# Patient Record
Sex: MALE | Race: BLACK OR AFRICAN AMERICAN
[De-identification: names, ages, dates, MRNs, and addresses within clinical notes are randomized per-mention and may not be internally consistent; named-entity substitution may affect disease eponyms.]

---

## 2018-02-05 ENCOUNTER — HOSPITAL ENCOUNTER (EMERGENCY)
Dept: HOSPITAL 74 - JER | Age: 23
Discharge: HOME | End: 2018-02-05
Payer: SELF-PAY

## 2018-02-05 VITALS — SYSTOLIC BLOOD PRESSURE: 142 MMHG | DIASTOLIC BLOOD PRESSURE: 89 MMHG | TEMPERATURE: 98.7 F | HEART RATE: 75 BPM

## 2018-02-05 VITALS — BODY MASS INDEX: 24.9 KG/M2

## 2018-02-05 DIAGNOSIS — B97.89: ICD-10-CM

## 2018-02-05 DIAGNOSIS — A08.4: Primary | ICD-10-CM

## 2018-02-05 LAB
ALBUMIN SERPL-MCNC: 4.5 G/DL (ref 3.4–5)
ALP SERPL-CCNC: 103 U/L (ref 45–117)
ALT SERPL-CCNC: 29 U/L (ref 12–78)
ANION GAP SERPL CALC-SCNC: 9 MMOL/L (ref 8–16)
AST SERPL-CCNC: 30 U/L (ref 15–37)
BASOPHILS # BLD: 0.3 % (ref 0–2)
BILIRUB SERPL-MCNC: 0.6 MG/DL (ref 0.2–1)
BUN SERPL-MCNC: 11 MG/DL (ref 7–18)
CALCIUM SERPL-MCNC: 9.8 MG/DL (ref 8.5–10.1)
CHLORIDE SERPL-SCNC: 100 MMOL/L (ref 98–107)
CO2 SERPL-SCNC: 28 MMOL/L (ref 21–32)
CREAT SERPL-MCNC: 1 MG/DL (ref 0.7–1.3)
DEPRECATED RDW RBC AUTO: 12.9 % (ref 11.9–15.9)
EOSINOPHIL # BLD: 0.1 % (ref 0–4.5)
GLUCOSE SERPL-MCNC: 125 MG/DL (ref 74–106)
HCT VFR BLD CALC: 46.3 % (ref 35.4–49)
HGB BLD-MCNC: 15.3 GM/DL (ref 11.7–16.9)
INR BLD: 1.19 (ref 0.82–1.09)
LIPASE SERPL-CCNC: 186 U/L (ref 73–393)
LYMPHOCYTES # BLD: 7.6 % (ref 8–40)
MCH RBC QN AUTO: 29 PG (ref 25.7–33.7)
MCHC RBC AUTO-ENTMCNC: 33.1 G/DL (ref 32–35.9)
MCV RBC: 87.8 FL (ref 80–96)
MONOCYTES # BLD AUTO: 3.5 % (ref 3.8–10.2)
NEUTROPHILS # BLD: 88.5 % (ref 42.8–82.8)
PLATELET # BLD AUTO: 221 K/MM3 (ref 134–434)
PMV BLD: 9.3 FL (ref 7.5–11.1)
POTASSIUM SERPLBLD-SCNC: 4.8 MMOL/L (ref 3.5–5.1)
PROT SERPL-MCNC: 8.4 G/DL (ref 6.4–8.2)
PT PNL PPP: 13.4 SEC (ref 9.98–11.88)
RBC # BLD AUTO: 5.27 M/MM3 (ref 4–5.6)
SODIUM SERPL-SCNC: 137 MMOL/L (ref 136–145)
WBC # BLD AUTO: 7.9 K/MM3 (ref 4–10)

## 2018-02-05 NOTE — PDOC
Attending Attestation





- Resident


Resident Name: Yessenia Crook





- ED Attending Attestation


I have performed the following: I have examined & evaluated the patient, The 

case was reviewed & discussed with the resident, I agree w/resident's findings 

& plan





- HPI


HPI: 





02/05/18 04:59


Pt comes with vomiting up his lox 4-5hrs after eating it.


No fever and no chills.


Pt has no other complaints.





- Physicial Exam


PE: 





02/05/18 05:00


Agree with resident exam.





- Medical Decision Making





02/05/18 05:00


IVF; pepcid; analgesics.


Home with PMD follow up.


02/05/18 06:15


Repeat lactic acid is 1.5

## 2018-02-05 NOTE — PDOC
History of Present Illness





- General


Stated Complaint: ABD PAIN


Time Seen by Provider: 02/05/18 01:44


History Source: Patient





- History of Present Illness


Initial Comments: 





02/05/18 03:40


22 y.o. male with no PMH who presents to the ED today c/o acute onset of 

abdominal pain and associated emesis.  Pain is sharp, 10/10 B/L LQ and does not 

radiate with no identifiable triggering or relieving factors.  Patient endorse 5

-6 episodes of NBNB emesis.  Patient denies any diarrhea/constipation and notes 

his last BM was earlier today and was normal.  Patient's most recent PO intake 

was at 1 p.m.





Patient denies chest pain, shortness of breath, fevers/chills.  








Past History





- Past Medical History


Allergies/Adverse Reactions: 


 Allergies











Allergy/AdvReac Type Severity Reaction Status Date / Time


 


No Known Drug Allergies Allergy   Verified 02/08/18 00:28


 


shellfish derived Allergy   Verified 02/07/18 22:13











Home Medications: 


Ambulatory Orders





Amox-Tr/K Cl [Augmentin - 500Mg Tablet] 1 tab PO BID #14 tab 02/08/18 











**Review of Systems





- Review of Systems


Constitutional: No: Chills, Fever


HEENTM: No: Recent change in vision


Respiratory: No: Shortness of Breath


Cardiac (ROS): No: Chest Pain


ABD/GI: Yes: Vomiting.  No: Blood Streaked Bowels, Constipated, Diarrhea, Nausea

, Tarry Stools


: No: Burning, Dysuria





*Physical Exam





- Physical Exam


General Appearance: Yes: Nourished, Appropriately Dressed


Neck: positive: Trachea midline, Supple


Respiratory/Chest: positive: Lungs Clear


Cardiovascular: positive: S1, S2


Gastrointestinal/Abdominal: positive: Normal Bowel Sounds, Soft.  negative: 

Protuberent, Guarding, Rebound, Hernia, Mass


Musculoskeletal: negative: CVA Tenderness (R), CVA Tenderness (L)


Extremity: positive: Normal Capillary Refill, Normal Inspection


Integumentary: positive: Normal Color, Dry, Warm


Neurologic: positive: Fully Oriented, Alert





ED Treatment Course





- LABORATORY


CBC & Chemistry Diagram: 


 02/05/18 02:03





 02/05/18 02:03





Medical Decision Making





- Medical Decision Making





22 y.o. male with no reported PMH presents with LLQ abdominal pain and emesis.  

On PE patient is hemodynamically stable and abdominal exam shows mild LLQ 

tenderness.  Afebrile, (-) leukocytosis, (-)  Lactic Acidosis resolved with IV 

NS.  Patient's pain improved with Toradol.  Will discharge home with return 

precautions. 








*DC/Admit/Observation/Transfer


Diagnosis at time of Disposition: 


 Abdominal pain








- Discharge Dispostion


Disposition: HOME


Condition at time of disposition: Good


Admit: No





- Referrals


Referrals: 


Lala Holbrook NP [Primary Care Provider] - 





- Patient Instructions


Printed Discharge Instructions:  DI for Viral Gastroenteritis -- Adult


Additional Instructions: 


Please follow-up with your primary care doctor in 3-5 days.  Return to the 

Emergency Department for any new/worsening/concerning symptoms. 





- Post Discharge Activity


Forms/Work/School Notes:  Back to Work

## 2018-02-07 ENCOUNTER — HOSPITAL ENCOUNTER (OUTPATIENT)
Dept: HOSPITAL 74 - JER | Age: 23
LOS: 1 days | Discharge: HOME | End: 2018-02-08
Attending: UROLOGY
Payer: SELF-PAY

## 2018-02-07 VITALS — BODY MASS INDEX: 19 KG/M2

## 2018-02-08 VITALS — SYSTOLIC BLOOD PRESSURE: 137 MMHG | DIASTOLIC BLOOD PRESSURE: 70 MMHG | HEART RATE: 92 BPM

## 2018-02-08 VITALS — TEMPERATURE: 98.8 F

## 2018-02-08 LAB
ALBUMIN SERPL-MCNC: 4 G/DL (ref 3.4–5)
ALP SERPL-CCNC: 96 U/L (ref 45–117)
ALT SERPL-CCNC: 23 U/L (ref 12–78)
ANION GAP SERPL CALC-SCNC: 8 MMOL/L (ref 8–16)
APPEARANCE UR: CLEAR
AST SERPL-CCNC: 33 U/L (ref 15–37)
BASOPHILS # BLD: 0.7 % (ref 0–2)
BILIRUB SERPL-MCNC: 0.7 MG/DL (ref 0.2–1)
BILIRUB UR STRIP.AUTO-MCNC: NEGATIVE MG/DL
BUN SERPL-MCNC: 17 MG/DL (ref 7–18)
CALCIUM SERPL-MCNC: 9.4 MG/DL (ref 8.5–10.1)
CHLORIDE SERPL-SCNC: 101 MMOL/L (ref 98–107)
CO2 SERPL-SCNC: 30 MMOL/L (ref 21–32)
COLOR UR: YELLOW
CREAT SERPL-MCNC: 1 MG/DL (ref 0.7–1.3)
DEPRECATED RDW RBC AUTO: 13.3 % (ref 11.9–15.9)
EOSINOPHIL # BLD: 1.5 % (ref 0–4.5)
GLUCOSE SERPL-MCNC: 97 MG/DL (ref 74–106)
HCT VFR BLD CALC: 45.2 % (ref 35.4–49)
HGB BLD-MCNC: 15.1 GM/DL (ref 11.7–16.9)
INR BLD: 1.22 (ref 0.82–1.09)
KETONES UR QL STRIP: NEGATIVE
LEUKOCYTE ESTERASE UR QL STRIP.AUTO: NEGATIVE
LYMPHOCYTES # BLD: 22.6 % (ref 8–40)
MCH RBC QN AUTO: 29.6 PG (ref 25.7–33.7)
MCHC RBC AUTO-ENTMCNC: 33.4 G/DL (ref 32–35.9)
MCV RBC: 88.5 FL (ref 80–96)
MONOCYTES # BLD AUTO: 15.9 % (ref 3.8–10.2)
NEUTROPHILS # BLD: 59.3 % (ref 42.8–82.8)
NITRITE UR QL STRIP: NEGATIVE
PH UR: 7 [PH] (ref 5–8)
PLATELET # BLD AUTO: 190 K/MM3 (ref 134–434)
PMV BLD: 9.1 FL (ref 7.5–11.1)
POTASSIUM SERPLBLD-SCNC: 4.4 MMOL/L (ref 3.5–5.1)
PROT SERPL-MCNC: 7.7 G/DL (ref 6.4–8.2)
PROT UR QL STRIP: NEGATIVE
PROT UR QL STRIP: NEGATIVE
PT PNL PPP: 13.8 SEC (ref 9.98–11.88)
RBC # BLD AUTO: 5.11 M/MM3 (ref 4–5.6)
RBC # UR STRIP: NEGATIVE /UL
SODIUM SERPL-SCNC: 139 MMOL/L (ref 136–145)
SP GR UR: 1.02 (ref 1–1.03)
UROBILINOGEN UR STRIP-MCNC: 2 MG/DL (ref 0.2–1)
WBC # BLD AUTO: 9.9 K/MM3 (ref 4–10)

## 2018-02-08 NOTE — PDOC
History of Present Illness





- General


History Source: Patient


Exam Limitations: No Limitations





- History of Present Illness


Initial Comments: 





02/08/18 01:06


The patient is a 22 year old male with no significant past medical history who 

returns to the ED for approximately 4 days of left lower quadrant pain. The 

patient presented to the ED complaining of LLQ pain with nausea and vomiting 2 

days ago (2/5/2018). Bloodwork was obtained, no imaging. The patient was 

discharged home. He states that since then his pain has been getting worse and 

migrating downward. Pain became significantly worse 6 AM the morning of ED 

arrival. He also now complains of left testicular pain and fullness. Vomiting 

is now resolved. No dysuria or hematuria. 





<Deborah Baker - Last Filed: 02/08/18 01:41>





<Chente Rojas - Last Filed: 02/08/18 02:00>





- General


Chief Complaint: Pain


Stated Complaint: PAIN


Time Seen by Provider: 02/08/18 00:10





Past History





<Deborah Baker - Last Filed: 02/08/18 01:41>





- Past Medical History


COPD: No





- Suicide/Smoking/Psychosocial Hx


Smoking History: Never smoked


Have you smoked in the past 12 months: No


Information on smoking cessation initiated: No


Hx Alcohol Use: No


Drug/Substance Use Hx: Yes (Marijuana)


Substance Use Type: Marijuana





<Chetne Rojas - Last Filed: 02/08/18 02:00>





- Past Medical History


Allergies/Adverse Reactions: 


 Allergies











Allergy/AdvReac Type Severity Reaction Status Date / Time


 


No Known Drug Allergies Allergy   Verified 02/08/18 00:28


 


shellfish derived Allergy   Verified 02/07/18 22:13











Home Medications: 


Ambulatory Orders





NK [No Known Home Medication]  02/05/18 











**Review of Systems





- Review of Systems


Able to Perform ROS?: Yes


Comments:: 





02/08/18 01:12


CONSTITUTIONAL: No fever, no chills, no fatigue


EYES: No visual changes


ENT: No ear pain, no sore throat


CARDIOVASCULAR: No chest pain, no palpitations


RESPIRATORY: No cough, no SOB


GI: +LLQ pain. +Nausea, vomiting (now resolved). No constipation, no diarrhea


GENITOURINARY: +Left testicular pain, swelling. No dysuria, no frequency, no 

hematuria


MUSCULOSKELETAL: No backpain, no joint pain, no myalgias


SKIN: No rash


NEURO: No headache








<Deborah Baker - Last Filed: 02/08/18 01:41>





*Physical Exam





- Vital Signs


 Last Vital Signs











Temp Pulse Resp BP Pulse Ox


 


 98.9 F   88   18   104/95   100 


 


 02/07/18 22:14  02/07/18 22:14  02/07/18 22:14  02/07/18 22:14  02/07/18 22:14














- Physical Exam


Comments: 





02/08/18 01:13


CONSTITUTIONAL: Well-appearing; well-nourished; in no apparent distress


HEAD: Normocephalic; atraumatic


EYES: PERRL; EOM intact


ENMT: External appears normal; normal oropharynx


NECK: Supple; non-tender; no cervical lymphadenopathy


CARD: Normal S1, S2; no murmurs, rubs, or gallops


RESP: Normal chest excursion with respiration; breath sounds clear and equal 

bilaterally; no wheezes, rhonchi, or rales


ABD: Mild left lower quadrant ttp; Soft, non-distended; no palpable organomegaly

, no palpable hernias


EXT: Normal ROM in all four extremities; non-tender to palpation; distal pulses 

intact


SKIN: Warm, dry, no rash


NEURO: No focal neurological deficiencies.


: L testicular is hard to touch, tender, and in horizontal line. 








<Deborah Baker - Last Filed: 02/08/18 01:41>





- Vital Signs


 Last Vital Signs











Temp Pulse Resp BP Pulse Ox


 


 98.9 F   88   18   104/95   100 


 


 02/07/18 22:14  02/07/18 22:14  02/07/18 22:14  02/07/18 22:14  02/07/18 22:14














<Chente Rojas - Last Filed: 02/08/18 02:00>





ED Treatment Course





- LABORATORY


CBC & Chemistry Diagram: 


 02/08/18 00:48





 02/08/18 00:48





- RADIOLOGY


Radiograph Interpretation: 





02/08/18 01:42


US of scrotum, preliminary reading by [facility] 


Findings communicated to me at 0130.





EXAM: ULTRASOUND SCROTUM and DUPLEX SCROTAL CONTENTS (INCLUDES ARTERIAL


AND VENOUS IMAGING)


Absent blood flow to left testis, which has heterogeneous echotexture and is 

slightly enlarged


compared to right (4.6 x 3.7 x 3.1 cm, compared to 4.6 x 2.9 x 2.6 cm on right)

. Left epididymal


head is also enlarged compared to right epididymis. Findings compatible with 

left testicular torsion.


Normal right testis.


Scrotal skin thickening.


THIS DOCUMENT HAS BEEN ELECTRONICALLY SIGNED


Yanira Mcdonald M.D.











<Deborah Baker - Last Filed: 02/08/18 01:41>





- LABORATORY


CBC & Chemistry Diagram: 


 02/08/18 00:48





 02/08/18 00:48





<Chente Rojas - Last Filed: 02/08/18 02:00>





Medical Decision Making





- Critical Care Time


Total Critical Care Time (minutes): 30


Critical Care Statement: The care of this patient involved high complexity 

decision making to prevent further life threatening deterioration of the patient

's condition and/or to evaluate & treat vital organ system(s) failure or risk 

of failure.





- Medical Decision Making





02/08/18 01:16


Pt sent for stat scrotal US. 


Awaiting preliminary interpretation from [facility]. 


Case discussed with Dr. Grayson of Urology at 0110 who is coming to evaluate 

the patient and take him to the OR. 





<Deborah Baker - Last Filed: 02/08/18 01:41>





- Medical Decision Making








02/08/18 01:29


Patient is a 22-year-old male who presented to the ER with atraumatic llq and 

left testicular pain.   pts sxs have persisted for over 24 hrs and became more 

severe at least 16 hrs pta.  in the ED, pts left testicle is hard to palpation, 

tender to touch, in no abnormal lie. Testicular ultrasound shows no evidence of 

blood flow to the ER left testicle with a heterogeneous appearance. Urology has 

been consult it and the patient will be promptly taken to the operating room. We

'll keep nothing by mouth. We'll administer pain meds.





<Chente Rojas - Last Filed: 02/08/18 02:00>





*DC/Admit/Observation/Transfer





- Attestations


Scribe Attestion: 





02/08/18 01:17





Documentation prepared by Deborah Baker, acting as medical scribe for Chente Rojas MD.





<Deborah Baker - Last Filed: 02/08/18 01:41>





- Discharge Dispostion


Admit: Yes





- Attestations


Physician Attestion: 





02/08/18 01:29


The documentation was prepared by the scribe under my direct supervision. I 

have reviewed the documentation which correctly represents the findings, 

medical decision-making and critical action taken by me.





<Chente Rojas - Last Filed: 02/08/18 02:00>


Diagnosis at time of Disposition: 


 Testicular torsion








- Discharge Dispostion


Condition at time of disposition: Fair

## 2018-02-08 NOTE — OP
Operative Note





- Note:


Operative Date: 02/08/18


Pre-Operative Diagnosis: left testicular torsion


Operation: left orchiectomy and right orchidopexy


Findings: 





left testis detorted without imrovement of testes; left testis not viable


Post-Operative Diagnosis: Same as Pre-op


Surgeon: Herbie Grayson


Anesthesia: General


Specimens Removed: left testis


Operative Report Dictated: Yes

## 2018-02-08 NOTE — OP
DATE OF OPERATION:  02/08/2018

 

PREOPERATIVE DIAGNOSIS:  Left testicular torsion.

 

POSTOPERATIVE DIAGNOSIS:  Left testicular torsion.

 

PROCEDURE:  Left orchiectomy and right orchidopexy.

 

ATTENDING:  Cleveland Berrios MD

 

ANESTHESIA:  General.

 

INDICATIONS FOR PROCEDURE:  The patient was evaluated and noted to have a left

testicular torsion with absence of the left cremasteric reflex.  Ultrasound had shown

that there is no blood flow to the testis.  The patient presented more than 12 hours

after initial onset of severe pain.  The patient was advised that there is a high

risk of an orchiectomy at the time of exploration.  The patient accepts the risks and

benefits of the procedure.

 

DESCRIPTION OF PROCEDURE:  The patient was brought in the operating room, placed in

supine position on the operating room table.  General anesthesia is administered. 

One gram of Ancef is administered.  Patient is prepped and draped in the usual

sterile manner.

 

A horizontal incision in the mid-scrotum on the left side was made.  The left testis

was brought out of the scrotal sac.  A torsion of the spermatic cord was noted.  The

testicle was untwisted.  Blood flow returned to a very small portion of the testis. 

The testis was dusky and dark blue.  It did not appear to be a viable testis after

10-15 minutes of observation.  It was decided to remove the testis.  Clamps were

placed on the spermatic cord, heavy _____ was taken, and the testis was removed.  A

suture ligature using 0 Vicryl was utilized for the spermatic stump.  A second free

tie was placed at the stump as well.  A 2-layer closure using 3-0 Vicryl and 3-0

chromic was made.  Interrupted stitches were utilized.  Prior to closure, the wound

was irrigated.  There were no complications noted.  No excessive bleeding was seen on

that side.

 

At this point, the right testis was examined.  It was decided to perform an

orchidopexy.  A right scrotal incision was made in the mid-scrotum.  Testis was

observed and noted to be healthy.  Three Prolene stitches were placed in the left

aspect, right aspect, and inferior aspect of the testis.  No bleeding or

complications were noted.  The wound was irrigated.  A 2-layer closure, as on the

other side, was performed.  No complications were noted.  The patient tolerated the

procedure very well.  The disposition of the patient was to recovery room.  Patient

will be observed and most likely discharged later today.  Patient will be sent home

on Augmentin.

 

 

CLEVELAND BERRIOS M.D.

 

/[id number]

DD: 02/08/2018 07:30

DT: 02/08/2018 08:40

Job #:  [id number]

## 2018-02-08 NOTE — CON.GU
Consult


Consult Specialty:: urology





- History of Present Illness


Chief Complaint: left testicular torsion


History of Present Illness: 





Patient with intermittent left lower abdominal pain for a few days.  Pain 

intensified at 6:00 am on 2/7.  Patient presented more than 12 hours after 

initial pain in left testis.  Patient denies fever, chills, or scrotal trauma.





- History Source


History Provided By: Patient


Limitations to Obtaining History: No Limitations





- Alcohol/Substance Use


Hx Alcohol Use: No





- Smoking History


Smoking history: Never smoked


Have you smoked in the past 12 months: No





Home Medications





- Allergies


Allergies/Adverse Reactions: 


 Allergies











Allergy/AdvReac Type Severity Reaction Status Date / Time


 


No Known Drug Allergies Allergy   Verified 02/08/18 00:28


 


shellfish derived Allergy   Verified 02/07/18 22:13














- Home Medications


Home Medications: 


Ambulatory Orders





NK [No Known Home Medication]  02/05/18 











Physical Exam-


Vital Signs: 


 Vital Signs











Temperature  98.9 F   02/07/18 22:14


 


Pulse Rate  82   02/08/18 04:09


 


Respiratory Rate  18   02/08/18 04:09


 


Blood Pressure  108/90   02/08/18 04:09


 


O2 Sat by Pulse Oximetry (%)  100   02/08/18 04:09











Constitutional: Yes: Well Nourished, No Distress, Calm


Eyes: Yes: WNL, Conjunctiva Clear, EOM Intact


HENT: Yes: WNL, Atraumatic, Normocephalic


Neck: Yes: WNL, Supple, Trachea Midline


Cardiovascular: Yes: WNL, Regular Rate and Rhythm


Respiratory: Yes: WNL, Regular


Gastrointestinal: Yes: WNL, Normal Bowel Sounds, Soft


Renal/: Yes: WNL


Kidneys: Yes: WNL


Pelvis: Yes: WNL


Testicles: Yes: Other (left testicular induration and swelling/absent 

cremasteric reflex)


Penis: Yes: WNL


Prostate Exam: Yes: Deferred


Labs: 


 CBC, BMP





 02/08/18 00:48 





 02/08/18 00:48 











Assessment/Plan





impression


left testicular torsion confirmed by doppler





plan


left scrotal exploration and possible right orchidopexy


procedure to be done emergently


discussed with patient  all risks and benefits including possible left 

orchiectomy


spent 25 minutes

## 2018-02-09 NOTE — PATH
Surgical Pathology Report



Patient Name:  EVELYN BOWSER

Accession #:  

Med. Rec. #:  [id number]                                                        

   /Age/Gender:  1995 (Age: 22) / M

Account:  [id number]                                                          

             Location: AMBULATORY SURG

Taken:  2018

Received:  2018

Reported:  2018

Physicians:  Herbie Grayson

  



Specimen(s) Received

 LEFT TESTICLE 





Clinical History

Torsion of testis







Final Diagnosis

TESTIS, LEFT, ORCHIECTOMY:

TESTICULAR TISSUE WITH INTERSTITIAL HEMORRHAGE, INFARCTION AND COAGULATIVE

NECROSIS.





***Electronically Signed***

Kenisha Hutchinson M.D.





Gross Description

Received in formalin labeled "left testicle," is a 58 g orchiectomy specimen,

including a 4.7 x 3.5 x 2.5 cm testis, a 4.5 x 2.5 x 1.5 cm epididymis and a 1

cm in length attached portion of spermatic cord. The outer surface is red-brown

and intact. Sectioning reveals diffusely hemorrhagic parenchyma of the testis

and epididymis. No masses are identified. Representative sections are submitted

in 5 cassettes as follows: 1-spermatic cord margin of resection;

2-3-representative testicular parenchyma with tunica albuginea and tunica

vaginalis; 4-epididymis; 5-testicular parenchyma with rete testis.

/2018